# Patient Record
Sex: MALE | Race: BLACK OR AFRICAN AMERICAN | NOT HISPANIC OR LATINO
[De-identification: names, ages, dates, MRNs, and addresses within clinical notes are randomized per-mention and may not be internally consistent; named-entity substitution may affect disease eponyms.]

---

## 2017-05-31 ENCOUNTER — FORM ENCOUNTER (OUTPATIENT)
Age: 77
End: 2017-05-31

## 2017-07-19 ENCOUNTER — FORM ENCOUNTER (OUTPATIENT)
Age: 77
End: 2017-07-19

## 2018-06-14 ENCOUNTER — FORM ENCOUNTER (OUTPATIENT)
Age: 78
End: 2018-06-14

## 2019-08-11 ENCOUNTER — FORM ENCOUNTER (OUTPATIENT)
Age: 79
End: 2019-08-11

## 2020-06-21 ENCOUNTER — FORM ENCOUNTER (OUTPATIENT)
Age: 80
End: 2020-06-21

## 2020-06-28 ENCOUNTER — FORM ENCOUNTER (OUTPATIENT)
Age: 80
End: 2020-06-28

## 2020-07-07 ENCOUNTER — FORM ENCOUNTER (OUTPATIENT)
Age: 80
End: 2020-07-07

## 2020-07-14 ENCOUNTER — FORM ENCOUNTER (OUTPATIENT)
Age: 80
End: 2020-07-14

## 2020-07-27 ENCOUNTER — FORM ENCOUNTER (OUTPATIENT)
Age: 80
End: 2020-07-27

## 2020-08-12 ENCOUNTER — FORM ENCOUNTER (OUTPATIENT)
Age: 80
End: 2020-08-12

## 2020-08-13 ENCOUNTER — FORM ENCOUNTER (OUTPATIENT)
Age: 80
End: 2020-08-13

## 2020-09-23 ENCOUNTER — FORM ENCOUNTER (OUTPATIENT)
Age: 80
End: 2020-09-23

## 2020-10-08 ENCOUNTER — FORM ENCOUNTER (OUTPATIENT)
Age: 80
End: 2020-10-08

## 2020-10-20 ENCOUNTER — FORM ENCOUNTER (OUTPATIENT)
Age: 80
End: 2020-10-20

## 2020-12-08 ENCOUNTER — FORM ENCOUNTER (OUTPATIENT)
Age: 80
End: 2020-12-08

## 2020-12-20 ENCOUNTER — FORM ENCOUNTER (OUTPATIENT)
Age: 80
End: 2020-12-20

## 2023-02-08 ENCOUNTER — APPOINTMENT (OUTPATIENT)
Dept: SURGERY | Facility: CLINIC | Age: 83
End: 2023-02-08
Payer: MEDICARE

## 2023-02-08 VITALS
HEART RATE: 101 BPM | HEIGHT: 70 IN | BODY MASS INDEX: 23.62 KG/M2 | DIASTOLIC BLOOD PRESSURE: 90 MMHG | SYSTOLIC BLOOD PRESSURE: 159 MMHG | TEMPERATURE: 97.2 F | RESPIRATION RATE: 18 BRPM | OXYGEN SATURATION: 98 % | WEIGHT: 165 LBS

## 2023-02-08 DIAGNOSIS — Z86.79 PERSONAL HISTORY OF OTHER DISEASES OF THE CIRCULATORY SYSTEM: ICD-10-CM

## 2023-02-08 DIAGNOSIS — R73.03 PREDIABETES.: ICD-10-CM

## 2023-02-08 DIAGNOSIS — Z78.9 OTHER SPECIFIED HEALTH STATUS: ICD-10-CM

## 2023-02-08 DIAGNOSIS — Z85.46 PERSONAL HISTORY OF MALIGNANT NEOPLASM OF PROSTATE: ICD-10-CM

## 2023-02-08 DIAGNOSIS — Z86.39 PERSONAL HISTORY OF OTHER ENDOCRINE, NUTRITIONAL AND METABOLIC DISEASE: ICD-10-CM

## 2023-02-08 DIAGNOSIS — Z86.69 PERSONAL HISTORY OF OTHER DISEASES OF THE NERVOUS SYSTEM AND SENSE ORGANS: ICD-10-CM

## 2023-02-08 DIAGNOSIS — Z87.19 PERSONAL HISTORY OF OTHER DISEASES OF THE DIGESTIVE SYSTEM: ICD-10-CM

## 2023-02-08 PROCEDURE — 99204 OFFICE O/P NEW MOD 45 MIN: CPT

## 2023-02-08 RX ORDER — FEXOFENADINE HCL 60 MG
CAPSULE ORAL
Refills: 0 | Status: ACTIVE | COMMUNITY

## 2023-02-08 RX ORDER — DEXLANSOPRAZOLE 60 MG/1
60 CAPSULE, DELAYED RELEASE ORAL
Refills: 0 | Status: ACTIVE | COMMUNITY

## 2023-02-08 RX ORDER — FEXOFENADINE HYDROCHLORIDE 180 MG/1
TABLET ORAL
Refills: 0 | Status: ACTIVE | COMMUNITY

## 2023-02-08 RX ORDER — VIBEGRON 75 MG/1
TABLET, FILM COATED ORAL
Refills: 0 | Status: ACTIVE | COMMUNITY

## 2023-02-08 RX ORDER — CHROMIUM 200 MCG
TABLET ORAL
Refills: 0 | Status: ACTIVE | COMMUNITY

## 2023-02-08 RX ORDER — LOSARTAN POTASSIUM 50 MG/1
50 TABLET, FILM COATED ORAL
Refills: 0 | Status: ACTIVE | COMMUNITY

## 2023-02-08 RX ORDER — DICLOFENAC SODIUM 10 MG/G
1 GEL TOPICAL
Refills: 0 | Status: ACTIVE | COMMUNITY

## 2023-02-08 RX ORDER — SIMETHICONE CHEW TAB 80 MG 80 MG
TABLET ORAL
Refills: 0 | Status: ACTIVE | COMMUNITY

## 2023-02-08 RX ORDER — UBROGEPANT 100 MG/1
100 TABLET ORAL
Refills: 0 | Status: ACTIVE | COMMUNITY

## 2023-02-08 RX ORDER — VERAPAMIL HCL 240 MG
240 TABLET, EXTENDED RELEASE ORAL
Refills: 0 | Status: ACTIVE | COMMUNITY

## 2023-02-08 RX ORDER — CHOLECALCIFEROL (VITAMIN D3) 25 MCG
TABLET ORAL
Refills: 0 | Status: ACTIVE | COMMUNITY

## 2023-02-08 RX ORDER — L.ACID,FERM,PLA,RHA/B.BIF,LONG 126 MG
TABLET, DELAYED AND EXTENDED RELEASE ORAL
Refills: 0 | Status: ACTIVE | COMMUNITY

## 2023-02-08 RX ORDER — BENZOCAINE/BENZALKONIUM/ALOE/E 5 %-0.13 %
10 CREAM (GRAM) TOPICAL
Refills: 0 | Status: ACTIVE | COMMUNITY

## 2023-02-08 RX ORDER — MAXZIDE 75; 50 MG/1; MG/1
75-50 TABLET ORAL
Refills: 0 | Status: ACTIVE | COMMUNITY

## 2023-02-08 RX ORDER — ATORVASTATIN CALCIUM 10 MG/1
10 TABLET, FILM COATED ORAL
Refills: 0 | Status: ACTIVE | COMMUNITY

## 2023-02-08 RX ORDER — PNV NO.95/FERROUS FUM/FOLIC AC 28MG-0.8MG
TABLET ORAL
Refills: 0 | Status: ACTIVE | COMMUNITY

## 2023-02-08 NOTE — PLAN
[FreeTextEntry1] : This patient is suffering from a symptomatic right inguinal hernia.  I have offered him repair of this hernia.  The procedure as well as risks(including, but not limited to bleeding, infection, injury to hollow viscus, chronic pain and testicular atrophy), benefits (pain relief), and alternatives were explained to the patient.

## 2023-02-08 NOTE — PHYSICAL EXAM
[Normal Breath Sounds] : Normal breath sounds [Normal Heart Sounds] : normal heart sounds [No Rash or Lesion] : No rash or lesion [Calm] : calm [de-identified] : No acute distress [de-identified] : Sclera clear [de-identified] : Supple [de-identified] : There is a small easily reducible nontender incisional hernia at the umbilicus.  There is a nontender reducible right inguinal hernia. [de-identified] : Penis and testicles normal [de-identified] : No clubbing cyanosis or edema [de-identified] : Cranial nerves II through XII grossly intact

## 2023-02-08 NOTE — CONSULT LETTER
[Dear  ___] : Dear  [unfilled], [Consult Letter:] : I had the pleasure of evaluating your patient, [unfilled]. [Please see my note below.] : Please see my note below. [Consult Closing:] : Thank you very much for allowing me to participate in the care of this patient.  If you have any questions, please do not hesitate to contact me. [Sincerely,] : Sincerely, [FreeTextEntry3] : Jonathan Roblero MD, FACS\par [facility]\par [facility]\par [address]\par [place]  [address]\par Tel: [phone]\par Email: [email or web address] \par  [DrJudah  ___] : Dr. MANN

## 2023-02-08 NOTE — HISTORY OF PRESENT ILLNESS
[de-identified] : MEAGHAN  is a 82 year  male  here for a consultation for possible right inguinal hernia. [de-identified] : This 82-year-old gentleman complains of a mildly painful bulge in the right groin that he first noted about 3 months ago.  He denies any change in bowel or urinary habits.  He had the left side repaired years ago.

## 2023-02-23 ENCOUNTER — OUTPATIENT (OUTPATIENT)
Dept: OUTPATIENT SERVICES | Facility: HOSPITAL | Age: 83
LOS: 1 days | End: 2023-02-23
Payer: MEDICARE

## 2023-02-23 VITALS
HEART RATE: 98 BPM | WEIGHT: 160.06 LBS | SYSTOLIC BLOOD PRESSURE: 146 MMHG | DIASTOLIC BLOOD PRESSURE: 80 MMHG | RESPIRATION RATE: 15 BRPM | OXYGEN SATURATION: 100 % | TEMPERATURE: 98 F | HEIGHT: 70 IN

## 2023-02-23 DIAGNOSIS — Z98.890 OTHER SPECIFIED POSTPROCEDURAL STATES: Chronic | ICD-10-CM

## 2023-02-23 DIAGNOSIS — Z98.49 CATARACT EXTRACTION STATUS, UNSPECIFIED EYE: Chronic | ICD-10-CM

## 2023-02-23 DIAGNOSIS — K40.90 UNILATERAL INGUINAL HERNIA, WITHOUT OBSTRUCTION OR GANGRENE, NOT SPECIFIED AS RECURRENT: ICD-10-CM

## 2023-02-23 DIAGNOSIS — Z01.818 ENCOUNTER FOR OTHER PREPROCEDURAL EXAMINATION: ICD-10-CM

## 2023-02-23 LAB
ANION GAP SERPL CALC-SCNC: 13 MMOL/L — SIGNIFICANT CHANGE UP (ref 5–17)
BUN SERPL-MCNC: 21 MG/DL — SIGNIFICANT CHANGE UP (ref 7–23)
CALCIUM SERPL-MCNC: 9.9 MG/DL — SIGNIFICANT CHANGE UP (ref 8.4–10.5)
CHLORIDE SERPL-SCNC: 102 MMOL/L — SIGNIFICANT CHANGE UP (ref 96–108)
CO2 SERPL-SCNC: 23 MMOL/L — SIGNIFICANT CHANGE UP (ref 22–31)
CREAT SERPL-MCNC: 1.01 MG/DL — SIGNIFICANT CHANGE UP (ref 0.5–1.3)
EGFR: 75 ML/MIN/1.73M2 — SIGNIFICANT CHANGE UP
GLUCOSE SERPL-MCNC: 129 MG/DL — HIGH (ref 70–99)
HCT VFR BLD CALC: 41.7 % — SIGNIFICANT CHANGE UP (ref 39–50)
HGB BLD-MCNC: 13.9 G/DL — SIGNIFICANT CHANGE UP (ref 13–17)
MCHC RBC-ENTMCNC: 31.8 PG — SIGNIFICANT CHANGE UP (ref 27–34)
MCHC RBC-ENTMCNC: 33.3 GM/DL — SIGNIFICANT CHANGE UP (ref 32–36)
MCV RBC AUTO: 95.4 FL — SIGNIFICANT CHANGE UP (ref 80–100)
NRBC # BLD: 0 /100 WBCS — SIGNIFICANT CHANGE UP (ref 0–0)
PLATELET # BLD AUTO: 184 K/UL — SIGNIFICANT CHANGE UP (ref 150–400)
POTASSIUM SERPL-MCNC: 3.3 MMOL/L — LOW (ref 3.5–5.3)
POTASSIUM SERPL-SCNC: 3.3 MMOL/L — LOW (ref 3.5–5.3)
RBC # BLD: 4.37 M/UL — SIGNIFICANT CHANGE UP (ref 4.2–5.8)
RBC # FLD: 13.2 % — SIGNIFICANT CHANGE UP (ref 10.3–14.5)
SODIUM SERPL-SCNC: 138 MMOL/L — SIGNIFICANT CHANGE UP (ref 135–145)
WBC # BLD: 5.08 K/UL — SIGNIFICANT CHANGE UP (ref 3.8–10.5)
WBC # FLD AUTO: 5.08 K/UL — SIGNIFICANT CHANGE UP (ref 3.8–10.5)

## 2023-02-23 PROCEDURE — 80048 BASIC METABOLIC PNL TOTAL CA: CPT

## 2023-02-23 PROCEDURE — G0463: CPT

## 2023-02-23 PROCEDURE — 85027 COMPLETE CBC AUTOMATED: CPT

## 2023-02-23 NOTE — H&P PST ADULT - NSICDXPASTMEDICALHX_GEN_ALL_CORE_FT
PAST MEDICAL HISTORY:  Anxiety     GERD (gastroesophageal reflux disease)     H/O migraine     History of prediabetes     HLD (hyperlipidemia)     HTN (hypertension)     Inguinal hernia     Prostate cancer

## 2023-02-23 NOTE — H&P PST ADULT - PROBLEM SELECTOR PLAN 1
Procedure: Right inguinal hernia repair 3/1/23  PST labs pending  AC: None  Medication changes: CoQ10- last day 2/23/23  Cardiac evaluation pending-2/27/23  Covid swab 2/26/23

## 2023-02-23 NOTE — H&P PST ADULT - HISTORY OF PRESENT ILLNESS
81 y/o M with PMHx of HTN, HLD, PreDM, GERD, prostate cancer s/p resection 2006, anxiety, migraine.  Presents to PST c/o right inguinal bulge associated with intermittent pain for about 8 months.  Now scheduled for Right inguinal hernia repair 3/1/23.  Patient denies SOB, CP, palpitation, blood in the stool or urine, N/V/D/C, HA, dizziness, seizures.    Denies Hx of Covid-19 Infx.   Covid swab on 3/1/23

## 2023-02-23 NOTE — H&P PST ADULT - NSICDXPASTSURGICALHX_GEN_ALL_CORE_FT
PAST SURGICAL HISTORY:  H/O carpal tunnel repair     H/O colonoscopy     H/O left inguinal hernia repair     History of prostate surgery     S/P cataract surgery

## 2023-02-23 NOTE — H&P PST ADULT - FUNCTIONAL STATUS
DASI score 6.70- ADL's, 1 flight of stair at home multiple times per day, supermarket shopping, denies SOB/4-10 METS

## 2023-02-26 ENCOUNTER — OUTPATIENT (OUTPATIENT)
Dept: OUTPATIENT SERVICES | Facility: HOSPITAL | Age: 83
LOS: 1 days | End: 2023-02-26
Payer: MEDICARE

## 2023-02-26 DIAGNOSIS — Z98.890 OTHER SPECIFIED POSTPROCEDURAL STATES: Chronic | ICD-10-CM

## 2023-02-26 DIAGNOSIS — Z11.52 ENCOUNTER FOR SCREENING FOR COVID-19: ICD-10-CM

## 2023-02-26 DIAGNOSIS — Z98.49 CATARACT EXTRACTION STATUS, UNSPECIFIED EYE: Chronic | ICD-10-CM

## 2023-02-26 LAB — SARS-COV-2 RNA SPEC QL NAA+PROBE: SIGNIFICANT CHANGE UP

## 2023-02-26 PROCEDURE — C9803: CPT

## 2023-02-26 PROCEDURE — U0005: CPT

## 2023-02-26 PROCEDURE — U0003: CPT

## 2023-02-28 ENCOUNTER — TRANSCRIPTION ENCOUNTER (OUTPATIENT)
Age: 83
End: 2023-02-28

## 2023-03-01 ENCOUNTER — OUTPATIENT (OUTPATIENT)
Dept: OUTPATIENT SERVICES | Facility: HOSPITAL | Age: 83
LOS: 1 days | End: 2023-03-01
Payer: MEDICARE

## 2023-03-01 ENCOUNTER — APPOINTMENT (OUTPATIENT)
Dept: SURGERY | Facility: HOSPITAL | Age: 83
End: 2023-03-01
Payer: MEDICARE

## 2023-03-01 ENCOUNTER — RESULT REVIEW (OUTPATIENT)
Age: 83
End: 2023-03-01

## 2023-03-01 ENCOUNTER — TRANSCRIPTION ENCOUNTER (OUTPATIENT)
Age: 83
End: 2023-03-01

## 2023-03-01 VITALS
OXYGEN SATURATION: 99 % | HEART RATE: 68 BPM | SYSTOLIC BLOOD PRESSURE: 115 MMHG | TEMPERATURE: 97 F | DIASTOLIC BLOOD PRESSURE: 68 MMHG | RESPIRATION RATE: 16 BRPM

## 2023-03-01 VITALS
HEIGHT: 70 IN | RESPIRATION RATE: 16 BRPM | WEIGHT: 160.06 LBS | SYSTOLIC BLOOD PRESSURE: 166 MMHG | DIASTOLIC BLOOD PRESSURE: 85 MMHG | HEART RATE: 92 BPM | OXYGEN SATURATION: 100 % | TEMPERATURE: 97 F

## 2023-03-01 DIAGNOSIS — Z01.818 ENCOUNTER FOR OTHER PREPROCEDURAL EXAMINATION: ICD-10-CM

## 2023-03-01 DIAGNOSIS — Z98.890 OTHER SPECIFIED POSTPROCEDURAL STATES: Chronic | ICD-10-CM

## 2023-03-01 DIAGNOSIS — Z98.49 CATARACT EXTRACTION STATUS, UNSPECIFIED EYE: Chronic | ICD-10-CM

## 2023-03-01 DIAGNOSIS — K40.90 UNILATERAL INGUINAL HERNIA, WITHOUT OBSTRUCTION OR GANGRENE, NOT SPECIFIED AS RECURRENT: ICD-10-CM

## 2023-03-01 PROCEDURE — C1781: CPT

## 2023-03-01 PROCEDURE — C9399: CPT

## 2023-03-01 PROCEDURE — 88302 TISSUE EXAM BY PATHOLOGIST: CPT

## 2023-03-01 PROCEDURE — 88302 TISSUE EXAM BY PATHOLOGIST: CPT | Mod: 26

## 2023-03-01 PROCEDURE — 49505 PRP I/HERN INIT REDUC >5 YR: CPT | Mod: RT

## 2023-03-01 DEVICE — MESH HERNIA MARLEX 3 X 6": Type: IMPLANTABLE DEVICE | Status: FUNCTIONAL

## 2023-03-01 RX ORDER — UBIDECARENONE 100 MG
1 CAPSULE ORAL
Qty: 0 | Refills: 0 | DISCHARGE

## 2023-03-01 RX ORDER — DEXLANSOPRAZOLE 30 MG/1
1 CAPSULE, DELAYED RELEASE ORAL
Qty: 0 | Refills: 0 | DISCHARGE

## 2023-03-01 RX ORDER — SODIUM CHLORIDE 9 MG/ML
1000 INJECTION, SOLUTION INTRAVENOUS
Refills: 0 | Status: ACTIVE | OUTPATIENT
Start: 2023-03-01 | End: 2024-01-28

## 2023-03-01 RX ORDER — UBROGEPANT 100 MG/1
1 TABLET ORAL
Qty: 0 | Refills: 0 | DISCHARGE

## 2023-03-01 RX ORDER — VIBEGRON 75 MG/1
1 TABLET, FILM COATED ORAL
Qty: 0 | Refills: 0 | DISCHARGE

## 2023-03-01 RX ORDER — FEXOFENADINE HCL 30 MG
1 TABLET ORAL
Qty: 0 | Refills: 0 | DISCHARGE

## 2023-03-01 RX ORDER — ALUMINUM ZIRCONIUM TRICHLOROHYDREX GLY 0.2 G/G
1 STICK TOPICAL
Qty: 0 | Refills: 0 | DISCHARGE

## 2023-03-01 RX ORDER — CHOLECALCIFEROL (VITAMIN D3) 125 MCG
1 CAPSULE ORAL
Qty: 0 | Refills: 0 | DISCHARGE

## 2023-03-01 RX ORDER — LOSARTAN POTASSIUM 100 MG/1
1 TABLET, FILM COATED ORAL
Qty: 0 | Refills: 0 | DISCHARGE

## 2023-03-01 RX ORDER — ATORVASTATIN CALCIUM 80 MG/1
1 TABLET, FILM COATED ORAL
Qty: 0 | Refills: 0 | DISCHARGE

## 2023-03-01 RX ORDER — SODIUM CHLORIDE 9 MG/ML
3 INJECTION INTRAMUSCULAR; INTRAVENOUS; SUBCUTANEOUS EVERY 8 HOURS
Refills: 0 | Status: ACTIVE | OUTPATIENT
Start: 2023-03-01 | End: 2024-01-28

## 2023-03-01 RX ORDER — OXYCODONE HYDROCHLORIDE 5 MG/1
1 TABLET ORAL
Qty: 6 | Refills: 0
Start: 2023-03-01 | End: 2023-03-02

## 2023-03-01 RX ORDER — LIDOCAINE HCL 20 MG/ML
0.2 VIAL (ML) INJECTION ONCE
Refills: 0 | Status: ACTIVE | OUTPATIENT
Start: 2023-03-01

## 2023-03-01 RX ORDER — TRIAMTERENE/HYDROCHLOROTHIAZID 75 MG-50MG
1 TABLET ORAL
Qty: 0 | Refills: 0 | DISCHARGE

## 2023-03-01 RX ORDER — ONDANSETRON 8 MG/1
4 TABLET, FILM COATED ORAL ONCE
Refills: 0 | Status: ACTIVE | OUTPATIENT
Start: 2023-03-01 | End: 2024-01-28

## 2023-03-01 RX ORDER — VERAPAMIL HCL 240 MG
1 CAPSULE, EXTENDED RELEASE PELLETS 24 HR ORAL
Qty: 0 | Refills: 0 | DISCHARGE

## 2023-03-01 RX ORDER — CHLORHEXIDINE GLUCONATE 213 G/1000ML
1 SOLUTION TOPICAL ONCE
Refills: 0 | Status: COMPLETED | OUTPATIENT
Start: 2023-03-01 | End: 2023-03-01

## 2023-03-01 RX ORDER — MULTIVIT-MIN/FERROUS GLUCONATE 9 MG/15 ML
1 LIQUID (ML) ORAL
Qty: 0 | Refills: 0 | DISCHARGE

## 2023-03-01 RX ORDER — CEFOTETAN DISODIUM 1 G
2 VIAL (EA) INJECTION ONCE
Refills: 0 | Status: ACTIVE | OUTPATIENT
Start: 2023-03-01 | End: 2023-03-01

## 2023-03-01 RX ORDER — FENTANYL CITRATE 50 UG/ML
25 INJECTION INTRAVENOUS
Refills: 0 | Status: DISCONTINUED | OUTPATIENT
Start: 2023-03-01 | End: 2023-03-01

## 2023-03-01 RX ADMIN — SODIUM CHLORIDE 100 MILLILITER(S): 9 INJECTION, SOLUTION INTRAVENOUS at 05:53

## 2023-03-01 RX ADMIN — CHLORHEXIDINE GLUCONATE 1 APPLICATION(S): 213 SOLUTION TOPICAL at 05:53

## 2023-03-01 NOTE — ASU DISCHARGE PLAN (ADULT/PEDIATRIC) - CARE PROVIDER_API CALL
Jonathan Jiang)  Surgery  [address], [address]  [place] [address]  Phone: [phone]  Fax: [phone]  Follow Up Time: 2 weeks

## 2023-03-01 NOTE — ASU DISCHARGE PLAN (ADULT/PEDIATRIC) - NURSING INSTRUCTIONS
Tylenol can be taken for pain every 6- 8 hours, last dose was given at 730am next dose can be taken after 130pm No

## 2023-03-02 PROBLEM — Z86.69 PERSONAL HISTORY OF OTHER DISEASES OF THE NERVOUS SYSTEM AND SENSE ORGANS: Chronic | Status: ACTIVE | Noted: 2023-02-23

## 2023-03-02 PROBLEM — C61 MALIGNANT NEOPLASM OF PROSTATE: Chronic | Status: ACTIVE | Noted: 2023-02-23

## 2023-03-02 PROBLEM — K40.90 UNILATERAL INGUINAL HERNIA, WITHOUT OBSTRUCTION OR GANGRENE, NOT SPECIFIED AS RECURRENT: Chronic | Status: ACTIVE | Noted: 2023-02-23

## 2023-03-02 PROBLEM — K21.9 GASTRO-ESOPHAGEAL REFLUX DISEASE WITHOUT ESOPHAGITIS: Chronic | Status: ACTIVE | Noted: 2023-02-23

## 2023-03-02 PROBLEM — Z87.898 PERSONAL HISTORY OF OTHER SPECIFIED CONDITIONS: Chronic | Status: ACTIVE | Noted: 2023-02-23

## 2023-03-02 PROBLEM — F41.9 ANXIETY DISORDER, UNSPECIFIED: Chronic | Status: ACTIVE | Noted: 2023-02-23

## 2023-03-02 PROBLEM — E78.5 HYPERLIPIDEMIA, UNSPECIFIED: Chronic | Status: ACTIVE | Noted: 2023-02-23

## 2023-03-02 PROBLEM — I10 ESSENTIAL (PRIMARY) HYPERTENSION: Chronic | Status: ACTIVE | Noted: 2023-02-23

## 2023-03-09 LAB — SURGICAL PATHOLOGY STUDY: SIGNIFICANT CHANGE UP

## 2023-03-13 NOTE — HISTORY OF PRESENT ILLNESS
[de-identified] : MEAGHAN is a 82 year old male here for s/p Repair of right inguinal hernia with mesh on 03/01/23.

## 2023-03-15 ENCOUNTER — APPOINTMENT (OUTPATIENT)
Dept: SURGERY | Facility: CLINIC | Age: 83
End: 2023-03-15
Payer: MEDICARE

## 2023-03-15 VITALS
HEIGHT: 70 IN | BODY MASS INDEX: 23.62 KG/M2 | WEIGHT: 165 LBS | TEMPERATURE: 98 F | RESPIRATION RATE: 18 BRPM | DIASTOLIC BLOOD PRESSURE: 72 MMHG | HEART RATE: 85 BPM | SYSTOLIC BLOOD PRESSURE: 134 MMHG | OXYGEN SATURATION: 98 %

## 2023-03-15 DIAGNOSIS — K40.90 UNILATERAL INGUINAL HERNIA, W/OUT OBSTRUCTION OR GANGRENE, NOT SPECIFIED AS RECURRENT: ICD-10-CM

## 2023-03-15 DIAGNOSIS — Z09 ENCOUNTER FOR FOLLOW-UP EXAMINATION AFTER COMPLETED TREATMENT FOR CONDITIONS OTHER THAN MALIGNANT NEOPLASM: ICD-10-CM

## 2023-03-15 PROCEDURE — 99024 POSTOP FOLLOW-UP VISIT: CPT

## 2023-08-03 ENCOUNTER — TRANSCRIPTION ENCOUNTER (OUTPATIENT)
Age: 83
End: 2023-08-03

## (undated) DEVICE — SUT SURGIPRO 2-0 30" GS-22

## (undated) DEVICE — DRAIN PENROSE 0.5X12" SILICONE

## (undated) DEVICE — SOL IRR POUR NS 0.9% 500ML

## (undated) DEVICE — SUT POLYSORB 2-0 18" TIES UNDYED

## (undated) DEVICE — BLADE SCALPEL SAFETYLOCK #15

## (undated) DEVICE — PREP CHLORAPREP HI-LITE ORANGE 26ML

## (undated) DEVICE — GLV 7.5 PROTEXIS (WHITE)

## (undated) DEVICE — SOL IRR POUR H2O 250ML

## (undated) DEVICE — WARMING BLANKET UPPER ADULT

## (undated) DEVICE — SUT POLYSORB 3-0 30" V-20 UNDYED

## (undated) DEVICE — VENODYNE/SCD SLEEVE CALF MEDIUM

## (undated) DEVICE — SUT MONOCRYL 4-0 27" PS-2 UNDYED

## (undated) DEVICE — MEDICATION LABELS W MARKER

## (undated) DEVICE — DRAPE INSTRUMENT POUCH 6.75" X 11"

## (undated) DEVICE — POSITIONER PATIENT SAFETY STRAP 3X60"

## (undated) DEVICE — DRAPE TOWEL BLUE 17" X 24"

## (undated) DEVICE — SUT POLYSORB 2-0 30" V-20 UNDYED

## (undated) DEVICE — SPECIMEN CONTAINER 100ML

## (undated) DEVICE — PACK ADULT HERNIA

## (undated) DEVICE — DRSG STERISTRIPS 0.5 X 4"

## (undated) DEVICE — SUT POLYSORB 3-0 18" TIES UNDYED